# Patient Record
Sex: FEMALE | Race: WHITE | NOT HISPANIC OR LATINO | Employment: FULL TIME | URBAN - METROPOLITAN AREA
[De-identification: names, ages, dates, MRNs, and addresses within clinical notes are randomized per-mention and may not be internally consistent; named-entity substitution may affect disease eponyms.]

---

## 2023-05-06 ENCOUNTER — HOSPITAL ENCOUNTER (EMERGENCY)
Facility: HOSPITAL | Age: 33
Discharge: HOME | End: 2023-05-06
Attending: SURGERY | Admitting: SURGERY

## 2023-05-06 ENCOUNTER — APPOINTMENT (EMERGENCY)
Dept: RADIOLOGY | Facility: HOSPITAL | Age: 33
End: 2023-05-06

## 2023-05-06 ENCOUNTER — APPOINTMENT (EMERGENCY)
Dept: CT IMAGING | Facility: HOSPITAL | Age: 33
End: 2023-05-06

## 2023-05-06 ENCOUNTER — APPOINTMENT (OUTPATIENT)
Dept: CT IMAGING | Facility: HOSPITAL | Age: 33
End: 2023-05-06

## 2023-05-06 VITALS
OXYGEN SATURATION: 95 % | WEIGHT: 163.58 LBS | DIASTOLIC BLOOD PRESSURE: 61 MMHG | SYSTOLIC BLOOD PRESSURE: 105 MMHG | RESPIRATION RATE: 16 BRPM | TEMPERATURE: 97.1 F | HEART RATE: 63 BPM
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
## 2023-05-06 DIAGNOSIS — I60.9 SAH (SUBARACHNOID HEMORRHAGE) (HCC): Primary | ICD-10-CM

## 2023-05-06 PROBLEM — S06.0XAA CONCUSSION: Status: ACTIVE | Noted: 2023-05-06

## 2023-05-06 PROBLEM — R20.0 NUMBNESS OF RIGHT ANTERIOR THIGH: Status: ACTIVE | Noted: 2023-05-06

## 2023-05-06 LAB
ALBUMIN SERPL BCP-MCNC: 4.1 G/DL (ref 3.5–5)
ALP SERPL-CCNC: 82 U/L (ref 34–104)
ALT SERPL W P-5'-P-CCNC: 38 U/L (ref 7–52)
ANION GAP SERPL CALCULATED.3IONS-SCNC: 4 MMOL/L (ref 4–13)
ANION GAP SERPL CALCULATED.3IONS-SCNC: 6 MMOL/L (ref 4–13)
AST SERPL W P-5'-P-CCNC: 21 U/L (ref 13–39)
BASE EXCESS BLDA CALC-SCNC: 4 MMOL/L (ref -2–3)
BASOPHILS # BLD AUTO: 0.05 THOUSANDS/ÂΜL (ref 0–0.1)
BASOPHILS NFR BLD AUTO: 1 % (ref 0–1)
BILIRUB SERPL-MCNC: 0.81 MG/DL (ref 0.2–1)
BUN SERPL-MCNC: 9 MG/DL (ref 5–25)
BUN SERPL-MCNC: 9 MG/DL (ref 5–25)
CA-I BLD-SCNC: 1.16 MMOL/L (ref 1.12–1.32)
CALCIUM SERPL-MCNC: 8.7 MG/DL (ref 8.4–10.2)
CALCIUM SERPL-MCNC: 9.1 MG/DL (ref 8.4–10.2)
CHLORIDE SERPL-SCNC: 106 MMOL/L (ref 96–108)
CHLORIDE SERPL-SCNC: 107 MMOL/L (ref 96–108)
CO2 SERPL-SCNC: 27 MMOL/L (ref 21–32)
CO2 SERPL-SCNC: 27 MMOL/L (ref 21–32)
CREAT SERPL-MCNC: 0.63 MG/DL (ref 0.6–1.3)
CREAT SERPL-MCNC: 0.71 MG/DL (ref 0.6–1.3)
EOSINOPHIL # BLD AUTO: 0.36 THOUSAND/ÂΜL (ref 0–0.61)
EOSINOPHIL NFR BLD AUTO: 5 % (ref 0–6)
ERYTHROCYTE [DISTWIDTH] IN BLOOD BY AUTOMATED COUNT: 12.1 % (ref 11.6–15.1)
ERYTHROCYTE [DISTWIDTH] IN BLOOD BY AUTOMATED COUNT: 12.2 % (ref 11.6–15.1)
GFR SERPL CREATININE-BSD FRML MDRD: 112 ML/MIN/1.73SQ M
GFR SERPL CREATININE-BSD FRML MDRD: 118 ML/MIN/1.73SQ M
GLUCOSE SERPL-MCNC: 82 MG/DL (ref 65–140)
GLUCOSE SERPL-MCNC: 87 MG/DL (ref 65–140)
GLUCOSE SERPL-MCNC: 93 MG/DL (ref 65–140)
HCO3 BLDA-SCNC: 26.5 MMOL/L (ref 24–30)
HCT VFR BLD AUTO: 39 % (ref 34.8–46.1)
HCT VFR BLD AUTO: 41.3 % (ref 34.8–46.1)
HCT VFR BLD CALC: 39 % (ref 34.8–46.1)
HGB BLD-MCNC: 13.2 G/DL (ref 11.5–15.4)
HGB BLD-MCNC: 14.2 G/DL (ref 11.5–15.4)
HGB BLDA-MCNC: 13.3 G/DL (ref 11.5–15.4)
IMM GRANULOCYTES # BLD AUTO: 0.02 THOUSAND/UL (ref 0–0.2)
IMM GRANULOCYTES NFR BLD AUTO: 0 % (ref 0–2)
LYMPHOCYTES # BLD AUTO: 1.92 THOUSANDS/ÂΜL (ref 0.6–4.47)
LYMPHOCYTES NFR BLD AUTO: 26 % (ref 14–44)
MCH RBC QN AUTO: 29.5 PG (ref 26.8–34.3)
MCH RBC QN AUTO: 29.7 PG (ref 26.8–34.3)
MCHC RBC AUTO-ENTMCNC: 33.8 G/DL (ref 31.4–37.4)
MCHC RBC AUTO-ENTMCNC: 34.4 G/DL (ref 31.4–37.4)
MCV RBC AUTO: 86 FL (ref 82–98)
MCV RBC AUTO: 87 FL (ref 82–98)
MONOCYTES # BLD AUTO: 0.55 THOUSAND/ÂΜL (ref 0.17–1.22)
MONOCYTES NFR BLD AUTO: 8 % (ref 4–12)
NEUTROPHILS # BLD AUTO: 4.4 THOUSANDS/ÂΜL (ref 1.85–7.62)
NEUTS SEG NFR BLD AUTO: 60 % (ref 43–75)
NRBC BLD AUTO-RTO: 0 /100 WBCS
PCO2 BLD: 28 MMOL/L (ref 21–32)
PCO2 BLD: 33 MM HG (ref 42–50)
PH BLD: 7.51 [PH] (ref 7.3–7.4)
PLATELET # BLD AUTO: 262 THOUSANDS/UL (ref 149–390)
PLATELET # BLD AUTO: 278 THOUSANDS/UL (ref 149–390)
PMV BLD AUTO: 8.5 FL (ref 8.9–12.7)
PMV BLD AUTO: 8.6 FL (ref 8.9–12.7)
PO2 BLD: 33 MM HG (ref 35–45)
POTASSIUM BLD-SCNC: 4.1 MMOL/L (ref 3.5–5.3)
POTASSIUM SERPL-SCNC: 3.5 MMOL/L (ref 3.5–5.3)
POTASSIUM SERPL-SCNC: 3.8 MMOL/L (ref 3.5–5.3)
PROT SERPL-MCNC: 6.3 G/DL (ref 6.4–8.4)
RBC # BLD AUTO: 4.48 MILLION/UL (ref 3.81–5.12)
RBC # BLD AUTO: 4.78 MILLION/UL (ref 3.81–5.12)
SAO2 % BLD FROM PO2: 71 % (ref 60–85)
SODIUM BLD-SCNC: 140 MMOL/L (ref 136–145)
SODIUM SERPL-SCNC: 138 MMOL/L (ref 135–147)
SODIUM SERPL-SCNC: 139 MMOL/L (ref 135–147)
SPECIMEN SOURCE: ABNORMAL
WBC # BLD AUTO: 7.3 THOUSAND/UL (ref 4.31–10.16)
WBC # BLD AUTO: 7.59 THOUSAND/UL (ref 4.31–10.16)

## 2023-05-06 RX ORDER — METHOCARBAMOL 500 MG/1
500 TABLET, FILM COATED ORAL EVERY 6 HOURS PRN
Status: DISCONTINUED | OUTPATIENT
Start: 2023-05-06 | End: 2023-05-06 | Stop reason: HOSPADM

## 2023-05-06 RX ORDER — METHOCARBAMOL 500 MG/1
500 TABLET, FILM COATED ORAL EVERY 6 HOURS PRN
Qty: 45 TABLET | Refills: 0 | Status: SHIPPED | OUTPATIENT
Start: 2023-05-06

## 2023-05-06 RX ORDER — ACETAMINOPHEN 325 MG/1
975 TABLET ORAL EVERY 8 HOURS SCHEDULED
Refills: 0
Start: 2023-05-06

## 2023-05-06 RX ORDER — LEVETIRACETAM 250 MG/1
500 TABLET ORAL EVERY 12 HOURS SCHEDULED
Status: DISCONTINUED | OUTPATIENT
Start: 2023-05-06 | End: 2023-05-06 | Stop reason: HOSPADM

## 2023-05-06 RX ORDER — IBUPROFEN 600 MG/1
600 TABLET ORAL EVERY 6 HOURS PRN
Qty: 30 TABLET | Refills: 0 | Status: SHIPPED | OUTPATIENT
Start: 2023-05-06

## 2023-05-06 RX ORDER — ACETAMINOPHEN 325 MG/1
975 TABLET ORAL EVERY 8 HOURS SCHEDULED
Status: DISCONTINUED | OUTPATIENT
Start: 2023-05-06 | End: 2023-05-06 | Stop reason: HOSPADM

## 2023-05-06 RX ADMIN — IOHEXOL 100 ML: 350 INJECTION, SOLUTION INTRAVENOUS at 00:27

## 2023-05-06 RX ADMIN — LEVETIRACETAM 500 MG: 250 TABLET, FILM COATED ORAL at 02:32

## 2023-05-06 NOTE — DISCHARGE INSTR - AVS FIRST PAGE
Seek medical attention if you develop worsening headaches, dizziness, visual changes, persistent nausea/vomiting, numbness/weakness/tingling of the extremities  Limit reading, texting, computer use and television to 15 minute intervals as tolerated  No strenuous physical activity until cleared by physician  No contact sports for 6 weeks  Avoid repeat head trauma for 6 weeks  Slowly re-introduce regular activity otherwise as tolerated  Please call the office with any concerns

## 2023-05-06 NOTE — LETTER
[address] [place] [address]  Dept: [phone]    May 6, 2023     Patient: Jose Florez   YOB: 1990   Date of Visit: 5/6/2023       To Whom it May Concern:    Jose Florez is under my professional care  She was seen in the hospital from 5/6/2023 to 05/06/23  She may return to work on Wednesday, 5/10/23 with the following limitations please allow her to take 30 minute breaks as needed if she were to develop post concussive sypmtoms of headahce, dizziness, nausea or fatigue  If symptoms persist despite rest, she should be exused from work to go home and rest and return her next scheduled shift  She will be re-evaluated by a physicain in 2 weeks       If you have any questions or concerns, please don't hesitate to call           Sincerely,          Kaylee Madrigal PA-C
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
## 2023-05-06 NOTE — INCIDENTAL FINDINGS
The following findings require follow up:  Radiographic finding   Finding: Prominence of the main pulmonary artery which may represent pulmonary artery hypertension   Follow up required: family doctor   Follow up should be done within 2 week(s)
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
## 2023-05-06 NOTE — ASSESSMENT & PLAN NOTE
- Neuro exam: GCS 15, non-focal  - Continue neurologic checks: Every 1 hours  - Reversal agent administered: Patient does not take anti-platelet or anti-coagulant medications  No reversal agent indicated  - CT scan of the head on 5/6 reviewed: Small left parietal [facility]  - Appreciate Neurosurgery evaluation and recommendations  - Complete 7 day course of Keppra for seizure prophylaxis  - Chemical DVT prophylaxis: Not cleared for chemical prophylaxis by neurosurgery at this time  Continue SCDs bilaterally  - Hold all anticoagulants and anti platelet medications for 2 weeks and/or until cleared by Neurosurgery to resume   - PT and OT (including cognitive evaluation) evaluation and treatment as indicated

## 2023-05-06 NOTE — ED NOTES
Meal tray provided  Pt sat upright to eat  Aware we are awaiting xrays to be read       Rachel Cruz RN  05/06/23 [id number]
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
## 2023-05-18 ENCOUNTER — OFFICE VISIT (OUTPATIENT)
Dept: SURGERY | Facility: CLINIC | Age: 33
End: 2023-05-18

## 2023-05-18 VITALS
TEMPERATURE: 98.6 F | WEIGHT: 162.2 LBS | RESPIRATION RATE: 12 BRPM | HEART RATE: 70 BPM | HEIGHT: 65 IN | SYSTOLIC BLOOD PRESSURE: 112 MMHG | DIASTOLIC BLOOD PRESSURE: 70 MMHG | OXYGEN SATURATION: 99 % | BODY MASS INDEX: 27.02 KG/M2

## 2023-05-18 DIAGNOSIS — S06.0XAA CONCUSSION: ICD-10-CM

## 2023-05-18 DIAGNOSIS — R20.0 NUMBNESS OF RIGHT ANTERIOR THIGH: Primary | ICD-10-CM
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
## 2023-05-18 NOTE — PATIENT INSTRUCTIONS
Seek medical attention if you develop worsening headaches, dizziness, visual changes, persistent nausea/vomiting, numbness/weakness/tingling of the extremities  Limit reading, texting, computer use and television to 15 minute intervals as tolerated  No strenuous physical activity until cleared by neurology  No contact sports for 6 weeks  Avoid repeat head trauma for 6 weeks  Slowly re-introduce regular activity otherwise as tolerated  Please call the office with any concerns  Follow up with neurology as scheduled

## 2023-05-18 NOTE — ASSESSMENT & PLAN NOTE
- continues to have post concussive headaches   - recommend continued outpatient f/u with neurology who she has seen once already and has an MRI brain and further follow up scheduled with   - discussed the importance of brain rest and activity to tolerance  Continue work as able with breaks as needed  She also confirms she is going to have help this weekend with caring for her child as well  - f/u with trauma on an as-needed basis since  - Tylenol/motrin as needed for headaches along with brain rest  Limit phone and screen time discussed  following with neurology as well
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
